# Patient Record
Sex: MALE | Race: WHITE | Employment: FULL TIME | URBAN - METROPOLITAN AREA
[De-identification: names, ages, dates, MRNs, and addresses within clinical notes are randomized per-mention and may not be internally consistent; named-entity substitution may affect disease eponyms.]

---

## 2021-11-11 ENCOUNTER — HOSPITAL ENCOUNTER (EMERGENCY)
Age: 51
Discharge: HOME | DRG: 392 | End: 2021-11-11
Payer: COMMERCIAL

## 2021-11-11 ENCOUNTER — APPOINTMENT (OUTPATIENT)
Dept: CT IMAGING | Age: 51
DRG: 392 | End: 2021-11-11
Payer: COMMERCIAL

## 2021-11-11 VITALS
RESPIRATION RATE: 16 BRPM | HEART RATE: 75 BPM | OXYGEN SATURATION: 100 % | TEMPERATURE: 97.5 F | DIASTOLIC BLOOD PRESSURE: 82 MMHG | SYSTOLIC BLOOD PRESSURE: 124 MMHG

## 2021-11-11 DIAGNOSIS — R93.5 ABNORMAL CT OF THE ABDOMEN: ICD-10-CM

## 2021-11-11 DIAGNOSIS — R10.9 ABDOMINAL PAIN, UNSPECIFIED ABDOMINAL LOCATION: Primary | ICD-10-CM

## 2021-11-11 PROBLEM — K56.609 SBO (SMALL BOWEL OBSTRUCTION) (HCC): Status: ACTIVE | Noted: 2021-11-11

## 2021-11-11 LAB
A/G RATIO: 1.6 (ref 1.1–2.2)
ALBUMIN SERPL-MCNC: 4.4 G/DL (ref 3.4–5)
ALP BLD-CCNC: 43 U/L (ref 40–129)
ALT SERPL-CCNC: 26 U/L (ref 10–40)
ANION GAP SERPL CALCULATED.3IONS-SCNC: 9 MMOL/L (ref 3–16)
AST SERPL-CCNC: 15 U/L (ref 15–37)
BASOPHILS ABSOLUTE: 0 K/UL (ref 0–0.2)
BASOPHILS RELATIVE PERCENT: 0.6 %
BILIRUB SERPL-MCNC: 0.5 MG/DL (ref 0–1)
BILIRUBIN URINE: NEGATIVE
BLOOD, URINE: ABNORMAL
BUN BLDV-MCNC: 16 MG/DL (ref 7–20)
CALCIUM SERPL-MCNC: 9.2 MG/DL (ref 8.3–10.6)
CHLORIDE BLD-SCNC: 104 MMOL/L (ref 99–110)
CLARITY: CLEAR
CO2: 28 MMOL/L (ref 21–32)
COLOR: YELLOW
CREAT SERPL-MCNC: 0.9 MG/DL (ref 0.9–1.3)
EOSINOPHILS ABSOLUTE: 0.1 K/UL (ref 0–0.6)
EOSINOPHILS RELATIVE PERCENT: 1 %
EPITHELIAL CELLS, UA: ABNORMAL /HPF (ref 0–5)
GFR AFRICAN AMERICAN: >60
GFR NON-AFRICAN AMERICAN: >60
GLUCOSE BLD-MCNC: 113 MG/DL (ref 70–99)
GLUCOSE URINE: NEGATIVE MG/DL
HCT VFR BLD CALC: 45.5 % (ref 40.5–52.5)
HEMOGLOBIN: 15.7 G/DL (ref 13.5–17.5)
KETONES, URINE: NEGATIVE MG/DL
LACTIC ACID: 1.4 MMOL/L (ref 0.4–2)
LEUKOCYTE ESTERASE, URINE: NEGATIVE
LIPASE: 25 U/L (ref 13–60)
LYMPHOCYTES ABSOLUTE: 2 K/UL (ref 1–5.1)
LYMPHOCYTES RELATIVE PERCENT: 28 %
MCH RBC QN AUTO: 32.7 PG (ref 26–34)
MCHC RBC AUTO-ENTMCNC: 34.6 G/DL (ref 31–36)
MCV RBC AUTO: 94.7 FL (ref 80–100)
MICROSCOPIC EXAMINATION: YES
MONOCYTES ABSOLUTE: 0.6 K/UL (ref 0–1.3)
MONOCYTES RELATIVE PERCENT: 7.8 %
NEUTROPHILS ABSOLUTE: 4.5 K/UL (ref 1.7–7.7)
NEUTROPHILS RELATIVE PERCENT: 62.6 %
NITRITE, URINE: NEGATIVE
PDW BLD-RTO: 12.8 % (ref 12.4–15.4)
PH UA: 7.5 (ref 5–8)
PLATELET # BLD: 246 K/UL (ref 135–450)
PMV BLD AUTO: 7.9 FL (ref 5–10.5)
POTASSIUM REFLEX MAGNESIUM: 4.3 MMOL/L (ref 3.5–5.1)
PROTEIN UA: NEGATIVE MG/DL
RBC # BLD: 4.8 M/UL (ref 4.2–5.9)
RBC UA: ABNORMAL /HPF (ref 0–4)
SODIUM BLD-SCNC: 141 MMOL/L (ref 136–145)
SPECIFIC GRAVITY UA: 1.02 (ref 1–1.03)
TOTAL PROTEIN: 7.2 G/DL (ref 6.4–8.2)
TROPONIN: <0.01 NG/ML
URINE TYPE: ABNORMAL
UROBILINOGEN, URINE: 0.2 E.U./DL
WBC # BLD: 7.1 K/UL (ref 4–11)
WBC UA: ABNORMAL /HPF (ref 0–5)

## 2021-11-11 PROCEDURE — 6360000004 HC RX CONTRAST MEDICATION: Performed by: PHYSICIAN ASSISTANT

## 2021-11-11 PROCEDURE — 74177 CT ABD & PELVIS W/CONTRAST: CPT

## 2021-11-11 PROCEDURE — 83605 ASSAY OF LACTIC ACID: CPT

## 2021-11-11 PROCEDURE — 81001 URINALYSIS AUTO W/SCOPE: CPT

## 2021-11-11 PROCEDURE — 99283 EMERGENCY DEPT VISIT LOW MDM: CPT

## 2021-11-11 PROCEDURE — 99205 OFFICE O/P NEW HI 60 MIN: CPT | Performed by: SURGERY

## 2021-11-11 PROCEDURE — 83690 ASSAY OF LIPASE: CPT

## 2021-11-11 PROCEDURE — 6370000000 HC RX 637 (ALT 250 FOR IP): Performed by: PHYSICIAN ASSISTANT

## 2021-11-11 PROCEDURE — 80053 COMPREHEN METABOLIC PANEL: CPT

## 2021-11-11 PROCEDURE — 84484 ASSAY OF TROPONIN QUANT: CPT

## 2021-11-11 PROCEDURE — 85025 COMPLETE CBC W/AUTO DIFF WBC: CPT

## 2021-11-11 PROCEDURE — 1200000000 HC SEMI PRIVATE

## 2021-11-11 RX ORDER — SODIUM CHLORIDE 0.9 % (FLUSH) 0.9 %
5-40 SYRINGE (ML) INJECTION PRN
Status: DISCONTINUED | OUTPATIENT
Start: 2021-11-11 | End: 2021-11-11

## 2021-11-11 RX ORDER — FAMOTIDINE 20 MG/1
20 TABLET, FILM COATED ORAL 2 TIMES DAILY
Status: DISCONTINUED | OUTPATIENT
Start: 2021-11-11 | End: 2021-11-11

## 2021-11-11 RX ORDER — PANTOPRAZOLE SODIUM 40 MG/1
40 TABLET, DELAYED RELEASE ORAL ONCE
Status: COMPLETED | OUTPATIENT
Start: 2021-11-11 | End: 2021-11-11

## 2021-11-11 RX ORDER — SODIUM CHLORIDE 9 MG/ML
INJECTION, SOLUTION INTRAVENOUS CONTINUOUS
Status: DISCONTINUED | OUTPATIENT
Start: 2021-11-11 | End: 2021-11-11

## 2021-11-11 RX ORDER — SODIUM CHLORIDE 0.9 % (FLUSH) 0.9 %
5-40 SYRINGE (ML) INJECTION EVERY 12 HOURS SCHEDULED
Status: DISCONTINUED | OUTPATIENT
Start: 2021-11-11 | End: 2021-11-11

## 2021-11-11 RX ORDER — ONDANSETRON 4 MG/1
4 TABLET, ORALLY DISINTEGRATING ORAL EVERY 8 HOURS PRN
Status: DISCONTINUED | OUTPATIENT
Start: 2021-11-11 | End: 2021-11-11

## 2021-11-11 RX ORDER — SODIUM CHLORIDE 9 MG/ML
25 INJECTION, SOLUTION INTRAVENOUS PRN
Status: DISCONTINUED | OUTPATIENT
Start: 2021-11-11 | End: 2021-11-11

## 2021-11-11 RX ORDER — ONDANSETRON 2 MG/ML
4 INJECTION INTRAMUSCULAR; INTRAVENOUS EVERY 6 HOURS PRN
Status: DISCONTINUED | OUTPATIENT
Start: 2021-11-11 | End: 2021-11-11

## 2021-11-11 RX ADMIN — IOPAMIDOL 75 ML: 755 INJECTION, SOLUTION INTRAVENOUS at 12:55

## 2021-11-11 RX ADMIN — MAGNESIUM HYDROXIDE/ALUMINUM HYDROXICE/SIMETHICONE: 120; 1200; 1200 SUSPENSION ORAL at 13:06

## 2021-11-11 RX ADMIN — PANTOPRAZOLE SODIUM 40 MG: 40 TABLET, DELAYED RELEASE ORAL at 12:05

## 2021-11-11 ASSESSMENT — PAIN DESCRIPTION - PAIN TYPE: TYPE: ACUTE PAIN

## 2021-11-11 ASSESSMENT — PAIN DESCRIPTION - DESCRIPTORS: DESCRIPTORS: ACHING;CRAMPING

## 2021-11-11 ASSESSMENT — PAIN DESCRIPTION - LOCATION: LOCATION: ABDOMEN

## 2021-11-11 ASSESSMENT — PAIN SCALES - GENERAL: PAINLEVEL_OUTOF10: 7

## 2021-11-11 ASSESSMENT — PAIN DESCRIPTION - FREQUENCY: FREQUENCY: INTERMITTENT

## 2021-11-11 NOTE — ED PROVIDER NOTES
[address] [facility]  ED  EMERGENCY DEPARTMENT ENCOUNTER        Pt Name: Fabian Garcia  MRN: [id number]  [place] 1970  Date of evaluation: 11/11/2021  Provider: IQRA May  PCP: Mima Toussaint MD    This patient was not seen and evaluated by the attending physician No att. providers found. I have evaluated this patient. My supervising physician was available for consultation. CHIEF COMPLAINT       Chief Complaint   Patient presents with    Abdominal Pain     /       HISTORY OF PRESENT ILLNESS   (Location/Symptom, Timing/Onset, Context/Setting, Quality, Duration, Modifying Factors, Severity)  Note limiting factors. Fabian Garcia is a 46 y.o. male who presents via private vehicle from his home for evaluation of abdominal pain. ED Course as of 11/15/21 0118   Thu Nov 11, 2021   1108 Mid ocotober started with epigastric abdominal pain that has been intermttent but worse over the past couple of days. It is more constant, x 2 days. It is dull and achy. It does not radiate. He notes that prior to it being constant it was random pain, not worse with eating or time of day. He has tried mylanta without improvement. He has no nasuea or emeis. No fevers. He notes diarrhea x last night. He denies melana or hematochezia. No chest pain no SOB. He denies urinary symptoms. He has never had pain like this before. He has had a colonoscopy in 2013 for melena and found to have internal hemorrhoid. He has never had egd. He saw Dr Chico Maldonado. He has an apt with Dr Chico Maldonado but he cannot be seen until Dec 3 but he presents here today because of the worsening painn   He has no hx abd surgery. He has no hx of heartburn indigestion. Denies food intolerances. He endorses typical [place] diet. [CS]                   Nursing Notes were all reviewed and agreed with or any disagreements were addressed  in the HPI. Pt was seen during the [place] 19 pandemic.  Appropriate PPE worn by ME during patient encounters. Pt seen during a time with constrained hospital bed capacity and other potential inpatient and outpatient resources were constrained due to the viral pandemic. REVIEW OF SYSTEMS    (2-9 systems for level 4, 10 or more for level 5)     Review of Systems    Positives and Pertinent negatives as per HPI. Except as noted abovein the ROS, all other systems were reviewed and negative. PAST MEDICAL HISTORY     Past Medical History:   Diagnosis Date    Hyperlipidemia          SURGICAL HISTORY     Past Surgical History:   Procedure Laterality Date    COLONOSCOPY  10/23/13    WISDOM TOOTH EXTRACTION           [address]       Discharge Medication List as of 11/11/2021  2:58 PM      CONTINUE these medications which have NOT CHANGED    Details   acyclovir (ZOVIRAX) 400 MG tablet Take 400 mg by mouth 3 times daily. pravastatin (PRAVACHOL) 40 MG tablet Take 40 mg by mouth daily. ALLERGIES     Orphenadrine citrate    FAMILYHISTORY       Family History   Problem Relation Age of Onset    Diabetes Mother           SOCIAL HISTORY       Social History     Socioeconomic History    Marital status:      Spouse name: None    Number of children: None    Years of education: None    Highest education level: None   Occupational History    None   Tobacco Use    Smoking status: Never Smoker    Smokeless tobacco: Never Used   Substance and Sexual Activity    Alcohol use: Yes     Alcohol/week: 2.0 standard drinks     Types: 2 Cans of beer per week    Drug use: No    Sexual activity: None   Other Topics Concern    None   Social History Narrative    None     Social Determinants of Health     Financial Resource Strain:     Difficulty of Paying Living Expenses: Not on file   Food Insecurity:     Worried About Running Out of Food in the Last Year: Not on file    Maik of Food in the Last Year: Not on file   Transportation Needs:     Lack of Transportation (Medical):  Not on file    Lack of Transportation (Non-Medical): Not on file   Physical Activity:     Days of Exercise per Week: Not on file    Minutes of Exercise per Session: Not on file   Stress:     Feeling of Stress : Not on file   Social Connections:     Frequency of Communication with Friends and Family: Not on file    Frequency of Social Gatherings with Friends and Family: Not on file    Attends Druze Services: Not on file    Active Member of [address] [place] [address] or Organizations: Not on file    Attends Club or Organization Meetings: Not on file    Marital Status: Not on file   Intimate Partner Violence:     Fear of Current or Ex-Partner: Not on file    Emotionally Abused: Not on file    Physically Abused: Not on file    Sexually Abused: Not on file   Housing Stability:     Unable to Pay for Housing in the Last Year: Not on file    Number of [place] in the Last Year: Not on file    Unstable Housing in the Last Year: Not on file       SCREENINGS             PHYSICAL EXAM    (up to 7 for level 4, 8 or more for level 5)     ED Triage Vitals [11/11/21 1056]   BP Temp Temp Source Pulse Resp SpO2 Height Weight   124/82 97.5 °F (36.4 °C) Oral 75 16 100 % -- --       Physical Exam  PHYSICAL EXAM  /82   Pulse 75   Temp 97.5 °F (36.4 °C) (Oral)   Resp 16   SpO2 100%   GENERAL APPEARANCE: Awake and alert. Cooperative. Adult male sitting upright in exam bed, nondiaphoretic, breathing comfortably on room air, showing no sign of acute respiratory distress. HEAD: Normocephalic. Atraumatic. EYES: PERRL. EOM's grossly intact. ENT: Mucous membranes are moist.. NECK: Supple. HEART: RRR. No murmurs. Radial pulse 2+ symmetric, PT pulse 2+, symmetric  LUNGS: Respirations unlabored. CTAB. Good air exchange. Speaking comfortably in full sentences. ABDOMEN: Soft. Non-distended. Mild epigastric tenderness to palpation without rigidity rebound rebound or guarding. Appropriate abdominal sounds appreciated throughout. No CVA tenderness. Non-tender. No masses. No organomegaly. No guarding or rebound. EXTREMITIES: No peripheral edema. Moves all extremities equally. All extremities neurovascularly intact. SKIN: Warm and dry. No acute rashes. NEUROLOGICAL: Alert and oriented. No gross facial drooping. Power intact to UE and LE, sensation intact x 4. No tremors or ataxia. Gait intact. PSYCHIATRIC: Normal mood and affect.     DIAGNOSTIC RESULTS   LABS:    Labs Reviewed   COMPREHENSIVE METABOLIC PANEL W/ REFLEX TO MG FOR LOW K - Abnormal; Notable for the following components:       Result Value    Glucose 113 (*)     All other components within normal limits    Narrative:     Performed at:  [place] [address] [place]   Phone [phone]   URINALYSIS - Abnormal; Notable for the following components:    Blood, Urine SMALL (*)     All other components within normal limits    Narrative:     Performed at:  [place] [address] [facility]   Phone [phone]   MICROSCOPIC URINALYSIS - Abnormal; Notable for the following components:    RBC, UA 11-20 (*)     All other components within normal limits    Narrative:     Performed at:  [address], [facility]   Phone [phone]   CBC WITH AUTO DIFFERENTIAL    Narrative:     Performed at:  [place] [address] [facility]   Phone [phone]   LIPASE    Narrative:     Performed at:  [place] [address] [facility]   Phone [phone]   TROPONIN    Narrative:     Performed at:  [place] [address] [facility]   Phone [phone]   LACTIC ACID, PLASMA    Narrative:     Performed at:  [facility]  [address] [place] [address], [place], Maulik Weiss   Phone [phone]       All other labs were within normal range or not returned as of this dictation. EKG: All EKG's are interpreted by the Emergency Department Physician who either signs orCo-signs this chart in the absence of a cardiologist.  Please see their note for interpretation of EKG. RADIOLOGY:   Non-plain film images such as CT, Ultrasound and MRI are read by the radiologist. Plain radiographic images are visualized andpreliminarily interpreted by the  ED Provider with the below findings:        Interpretation perthe Radiologist below, if available at the time of this note:    CT ABDOMEN PELVIS W IV CONTRAST Additional Contrast? None   Final Result   Low-moderate grade small bowel obstruction, mid distal ileal level, with   findings as above. Small amount of pathologic free fluid posterior pelvis. No free air. Common non-stenosed origin celiac/superior mesenteric arteries. Patent CORY. Additional findings, as above. No results found. PROCEDURES   Unless otherwise noted below, none     Procedures    CRITICAL CARE TIME   N/A    CONSULTS:  IP CONSULT TO GENERAL SURGERY  IP CONSULT TO GENERAL SURGERY      EMERGENCY DEPARTMENT COURSE and DIFFERENTIALDIAGNOSIS/MDM:   Vitals:    Vitals:    11/11/21 1056   BP: 124/82   Pulse: 75   Resp: 16   Temp: 97.5 °F (36.4 °C)   TempSrc: Oral   SpO2: 100%       Patient was given thefollowing medications:  Medications   aluminum & magnesium hydroxide-simethicone (MAALOX) 30 mL, lidocaine viscous hcl (XYLOCAINE) 5 mL (GI COCKTAIL) ( Oral Given 11/11/21 1306)   pantoprazole (PROTONIX) tablet 40 mg (40 mg Oral Given 11/11/21 1205)   iopamidol (ISOVUE-370) 76 % injection 75 mL (75 mLs IntraVENous Given 11/11/21 1255)       PDMP Monitoring:    Last PDMP Elie as Reviewed [facility]):  Review User Review Instant Review Result            Urine Drug Screenings (1 yr)    No resulted procedures found.        Medication Contract and Consent for Opioid Use Documents Filed      No documents found                MDM:   Patient seen and evaluated. Old records reviewed. Diagnostic testing reviewed and results discussed. I have independently evaluated this patient based upon my scope of practice. Supervising physician was in the department for consultation as needed. 77-year-old male presents for evaluation of abdominal pain. Very reassuring physical exam.  CT reveals questionable small bowel obstruction. Labs very reassuring. No lactic acidosis I have no concern for acute mesenteric ischemia. Patient is very comfortable in the exam room, felt improved with GI cocktail and Protonix. General surgery was consulted and saw the patient at bedside and after their exam and discussion with the patient and review of the CT believes that the patient is a reasonable candidate for discharge home with very strict ER return precautions, instructions to return immediately should he develop any new or worsening symptoms. Pt is in agreement with the current plan and all questions were addressed. Discharge Time out:  CC Reviewed Yes   Test Results Yes     Vitals:    11/11/21 1056   BP: 124/82   Pulse: 75   Resp: 16   Temp: 97.5 °F (36.4 °C)   SpO2: 100%              FINAL IMPRESSION      1. Abdominal pain, unspecified abdominal location    2.  Abnormal CT of the abdomen          DISPOSITION/PLAN   DISPOSITION Decision To Discharge 11/11/2021 02:43:22 PM      PATIENT REFERREDTO:  Omar Sky MD  [address] [phone]    Schedule an appointment as soon as possible for a visit       Sharla Willis MD  [address] [place] [address] [address], [address] [facility]  [phone]    Go to   If symptoms worsen    [facility]  ED  [facility]  [address] [phone]  Go to   If symptoms worsen      DISCHARGE MEDICATIONS:  Discharge Medication List as of 11/11/2021  2:58 PM DISCONTINUED MEDICATIONS:  Discharge Medication List as of 11/11/2021  2:58 PM                 (Please note that portions ofthis note were completed with a voice recognition program.  Efforts were made to edit the dictations but occasionally words are mis-transcribed.)    [place]anes, [place] (electronically signed)        [place]anes, [place]  11/15/21 0121

## 2021-11-11 NOTE — CONSULTS
Department of General Surgery Consult    PATIENT NAME: Naheed Orellana   YOB: 1970    ADMISSION DATE: 11/11/2021 10:50 AM      TODAY'S DATE: 11/11/2021    Reason for Consult: early sbo    Chief Complaint: abd pain     Requesting Physician:  Obie Poole    HISTORY OF PRESENT ILLNESS:              The patient is a 46 y.o. male who presents with abdominal pain. Reports mid abdominal pain off and on for about three weeks. Worse today. No nausea or emesis. Having bms daily and some recent diarrhea. No fevers or chills. Prior left inguinal hernia repair from anterior approach. Reports following up with NATHAN Mo in early December for these symptoms. Last colonoscopy in 2013 screening. No family history of IBD. Past Medical History:        Diagnosis Date    Hyperlipidemia        Past Surgical History:        Procedure Laterality Date    COLONOSCOPY  10/23/13    WISDOM TOOTH EXTRACTION         Current Medications:   No current facility-administered medications for this encounter. Prior to Admission medications    Medication Sig Start Date End Date Taking? Authorizing Provider   acyclovir (ZOVIRAX) 400 MG tablet Take 400 mg by mouth 3 times daily. Yes Historical Provider, MD   pravastatin (PRAVACHOL) 40 MG tablet Take 40 mg by mouth daily. Yes Historical Provider, MD        Allergies:  Orphenadrine citrate    Social History:   TOBACCO:  no  ETOH: yes  Family History:        Problem Relation Age of Onset    Diabetes Mother        REVIEW OF SYSTEMS:  CONSTITUTIONAL:  negative  HEENT:  negative  RESPIRATORY:  negative  CARDIOVASCULAR:  negative  GASTROINTESTINAL:  negative except for diarrhea and abdominal pain  GENITOURINARY:  negative  HEMATOLOGIC/LYMPHATIC:  negative  NEUROLOGICAL:  Negative  * All other ROS reviewed and negative.        PHYSICAL EXAM:  VITALS:  /82   Pulse 75   Temp 97.5 °F (36.4 °C) (Oral)   Resp 16   SpO2 100%   24HR INTAKE/OUTPUT:    No intake/output data recorded. No intake/output data recorded. CONSTITUTIONAL:  alert, no apparent distress and normal weight  EYES:  PERRL, sclera clear  ENT:  Normocephalic,atraumatic, without obvious abnormality  NECK:  supple, symmetrical, trachea midline  LUNGS: Resp effort easy and unlabored, no crackles or wheezing  CARDIOVASCULAR:  NO JVD, regular rate and rhythm and no murmur noted  ABDOMEN:normal bowel sounds, soft, non-distended, minimal tender  MUSCULOSKELETAL: No clubbing or cyanosis, 0+ pitting edema lower extremities  NEUROLOGIC:  Mental Status Exam:  Level of Alertness:   awake  PSYCHIATRIC:   person, place, time  SKIN:  no bruising or bleeding    DATA:    CBC:   Recent Labs     11/11/21  1149   WBC 7.1   HGB 15.7   HCT 45.5        BMP:    Recent Labs     11/11/21  1149      K 4.3      CO2 28   BUN 16   CREATININE 0.9   GLUCOSE 113*     Hepatic:   Recent Labs     11/11/21  1149   AST 15   ALT 26   BILITOT 0.5   ALKPHOS 43     Mag:    No results for input(s): MG in the last 72 hours. Phos:   No results for input(s): PHOS in the last 72 hours. INR: No results for input(s): INR in the last 72 hours. Radiology Review: Images personally reviewed by me. CT - possible early SBO with some inflammation of small bowel loops distal ileum      IMPRESSION/RECOMMENDATIONS:    45 yo with possible early SBO vs enteritis  Symptoms occurring over the past three weeks. Having bms daily with some diarrhea and eating normally. No prior intraabdominal operations or hernias as risk factors. At this point not requiring narcotic pain meds and no nausea. [id number] Miryam Forbes for discharge and would continue with plan for follow up with GI to see if other functional/inflammatory cause of his symptoms. Discussed returning to ER if symptoms worsen.     Electronically signed by Thao Zavaleta, [address] [place] [address] Surgery  [phone]

## 2021-11-11 NOTE — Clinical Note
Patient Class: Inpatient [101]   REQUIRED: Diagnosis: SBO (small bowel obstruction) ([facility].) [[id number]]   Estimated Length of Stay: Estimated stay of more than 2 midnights

## 2021-11-11 NOTE — ED NOTES
Consult called to General Surgery @2584 11/11/21, per QIRA Jason  RE: SBO  Dr. Hardy Peterson returned call and spoke to Gris Gonzalez  11/11/21 6504

## 2021-11-24 ENCOUNTER — HOSPITAL ENCOUNTER (OUTPATIENT)
Dept: GENERAL RADIOLOGY | Age: 51
Discharge: HOME | End: 2021-11-24
Payer: COMMERCIAL

## 2021-11-24 DIAGNOSIS — K56.609 INTESTINAL OBSTRUCTION, UNSPECIFIED CAUSE, UNSPECIFIED WHETHER PARTIAL OR COMPLETE (HCC): ICD-10-CM

## 2021-11-24 PROCEDURE — 74250 X-RAY XM SM INT 1CNTRST STD: CPT

## 2024-11-14 ENCOUNTER — HOSPITAL ENCOUNTER (OUTPATIENT)
Dept: CT IMAGING | Age: 54
Discharge: HOME | End: 2024-11-14
Attending: INTERNAL MEDICINE
Payer: COMMERCIAL

## 2024-11-14 DIAGNOSIS — R10.31 ABDOMINAL PAIN, RIGHT LOWER QUADRANT: ICD-10-CM

## 2024-11-14 DIAGNOSIS — Z87.19 HISTORY OF SMALL BOWEL OBSTRUCTION: ICD-10-CM

## 2024-11-14 PROCEDURE — 2500000003 HC RX 250 WO HCPCS: Performed by: INTERNAL MEDICINE

## 2024-11-14 PROCEDURE — 74177 CT ABD & PELVIS W/CONTRAST: CPT

## 2024-11-14 PROCEDURE — 6360000004 HC RX CONTRAST MEDICATION: Performed by: INTERNAL MEDICINE

## 2024-11-14 RX ORDER — IOPAMIDOL 755 MG/ML
100 INJECTION, SOLUTION INTRAVASCULAR
Status: COMPLETED | OUTPATIENT
Start: 2024-11-14 | End: 2024-11-14

## 2024-11-14 RX ADMIN — BARIUM SULFATE 1350 ML: 1 SUSPENSION ORAL at 14:46

## 2024-11-14 RX ADMIN — IOPAMIDOL 100 ML: 755 INJECTION, SOLUTION INTRAVENOUS at 14:47
